# Patient Record
Sex: FEMALE | Race: WHITE | NOT HISPANIC OR LATINO | URBAN - METROPOLITAN AREA
[De-identification: names, ages, dates, MRNs, and addresses within clinical notes are randomized per-mention and may not be internally consistent; named-entity substitution may affect disease eponyms.]

---

## 2022-12-14 ENCOUNTER — EMERGENCY (EMERGENCY)
Facility: HOSPITAL | Age: 52
LOS: 1 days | Discharge: HOME | End: 2022-12-14
Attending: EMERGENCY MEDICINE | Admitting: EMERGENCY MEDICINE
Payer: COMMERCIAL

## 2022-12-14 VITALS
OXYGEN SATURATION: 98 % | DIASTOLIC BLOOD PRESSURE: 72 MMHG | HEART RATE: 100 BPM | TEMPERATURE: 98 F | SYSTOLIC BLOOD PRESSURE: 125 MMHG | RESPIRATION RATE: 18 BRPM

## 2022-12-14 VITALS
DIASTOLIC BLOOD PRESSURE: 74 MMHG | WEIGHT: 111.99 LBS | SYSTOLIC BLOOD PRESSURE: 119 MMHG | OXYGEN SATURATION: 97 % | HEART RATE: 120 BPM | HEIGHT: 65 IN | RESPIRATION RATE: 18 BRPM | TEMPERATURE: 99 F

## 2022-12-14 DIAGNOSIS — R94.5 ABNORMAL RESULTS OF LIVER FUNCTION STUDIES: ICD-10-CM

## 2022-12-14 DIAGNOSIS — D72.829 ELEVATED WHITE BLOOD CELL COUNT, UNSPECIFIED: ICD-10-CM

## 2022-12-14 DIAGNOSIS — R79.89 OTHER SPECIFIED ABNORMAL FINDINGS OF BLOOD CHEMISTRY: ICD-10-CM

## 2022-12-14 DIAGNOSIS — Z86.16 PERSONAL HISTORY OF COVID-19: ICD-10-CM

## 2022-12-14 DIAGNOSIS — Z88.0 ALLERGY STATUS TO PENICILLIN: ICD-10-CM

## 2022-12-14 DIAGNOSIS — Z20.822 CONTACT WITH AND (SUSPECTED) EXPOSURE TO COVID-19: ICD-10-CM

## 2022-12-14 DIAGNOSIS — E88.89 OTHER SPECIFIED METABOLIC DISORDERS: ICD-10-CM

## 2022-12-14 LAB
ALBUMIN SERPL ELPH-MCNC: 4.8 G/DL — SIGNIFICANT CHANGE UP (ref 3.3–5)
ALP SERPL-CCNC: 92 U/L — SIGNIFICANT CHANGE UP (ref 40–120)
ALT FLD-CCNC: 248 U/L — HIGH (ref 10–45)
ANION GAP SERPL CALC-SCNC: 16 MMOL/L — SIGNIFICANT CHANGE UP (ref 5–17)
APPEARANCE UR: ABNORMAL
APTT BLD: 31.5 SEC — SIGNIFICANT CHANGE UP (ref 27.5–35.5)
AST SERPL-CCNC: 118 U/L — HIGH (ref 10–40)
BASE EXCESS BLDV CALC-SCNC: 1.7 MMOL/L — SIGNIFICANT CHANGE UP (ref -2–3)
BASOPHILS # BLD AUTO: 0.05 K/UL — SIGNIFICANT CHANGE UP (ref 0–0.2)
BASOPHILS NFR BLD AUTO: 0.4 % — SIGNIFICANT CHANGE UP (ref 0–2)
BILIRUB SERPL-MCNC: 0.6 MG/DL — SIGNIFICANT CHANGE UP (ref 0.2–1.2)
BILIRUB UR-MCNC: ABNORMAL
BUN SERPL-MCNC: 17 MG/DL — SIGNIFICANT CHANGE UP (ref 7–23)
CALCIUM SERPL-MCNC: 10.2 MG/DL — SIGNIFICANT CHANGE UP (ref 8.4–10.5)
CHLORIDE SERPL-SCNC: 93 MMOL/L — LOW (ref 96–108)
CO2 BLDV-SCNC: 28.6 MMOL/L — HIGH (ref 22–26)
CO2 SERPL-SCNC: 26 MMOL/L — SIGNIFICANT CHANGE UP (ref 22–31)
COLOR SPEC: YELLOW — SIGNIFICANT CHANGE UP
CREAT SERPL-MCNC: 0.85 MG/DL — SIGNIFICANT CHANGE UP (ref 0.5–1.3)
DIFF PNL FLD: NEGATIVE — SIGNIFICANT CHANGE UP
EGFR: 82 ML/MIN/1.73M2 — SIGNIFICANT CHANGE UP
EOSINOPHIL # BLD AUTO: 0.08 K/UL — SIGNIFICANT CHANGE UP (ref 0–0.5)
EOSINOPHIL NFR BLD AUTO: 0.6 % — SIGNIFICANT CHANGE UP (ref 0–6)
GLUCOSE SERPL-MCNC: 83 MG/DL — SIGNIFICANT CHANGE UP (ref 70–99)
GLUCOSE UR QL: NEGATIVE — SIGNIFICANT CHANGE UP
HCO3 BLDV-SCNC: 27 MMOL/L — SIGNIFICANT CHANGE UP (ref 22–29)
HCT VFR BLD CALC: 38.1 % — SIGNIFICANT CHANGE UP (ref 34.5–45)
HGB BLD-MCNC: 12.5 G/DL — SIGNIFICANT CHANGE UP (ref 11.5–15.5)
IMM GRANULOCYTES NFR BLD AUTO: 0.6 % — SIGNIFICANT CHANGE UP (ref 0–0.9)
INR BLD: 1.19 — HIGH (ref 0.88–1.16)
KETONES UR-MCNC: >=80 MG/DL
LACTATE SERPL-SCNC: 1.3 MMOL/L — SIGNIFICANT CHANGE UP (ref 0.5–2)
LEUKOCYTE ESTERASE UR-ACNC: NEGATIVE — SIGNIFICANT CHANGE UP
LYMPHOCYTES # BLD AUTO: 14.8 % — SIGNIFICANT CHANGE UP (ref 13–44)
LYMPHOCYTES # BLD AUTO: 2.08 K/UL — SIGNIFICANT CHANGE UP (ref 1–3.3)
MCHC RBC-ENTMCNC: 27.2 PG — SIGNIFICANT CHANGE UP (ref 27–34)
MCHC RBC-ENTMCNC: 32.8 GM/DL — SIGNIFICANT CHANGE UP (ref 32–36)
MCV RBC AUTO: 82.8 FL — SIGNIFICANT CHANGE UP (ref 80–100)
MONOCYTES # BLD AUTO: 0.74 K/UL — SIGNIFICANT CHANGE UP (ref 0–0.9)
MONOCYTES NFR BLD AUTO: 5.3 % — SIGNIFICANT CHANGE UP (ref 2–14)
NEUTROPHILS # BLD AUTO: 10.97 K/UL — HIGH (ref 1.8–7.4)
NEUTROPHILS NFR BLD AUTO: 78.3 % — HIGH (ref 43–77)
NITRITE UR-MCNC: NEGATIVE — SIGNIFICANT CHANGE UP
NRBC # BLD: 0 /100 WBCS — SIGNIFICANT CHANGE UP (ref 0–0)
PCO2 BLDV: 45 MMHG — HIGH (ref 39–42)
PH BLDV: 7.39 — SIGNIFICANT CHANGE UP (ref 7.32–7.43)
PH UR: 6 — SIGNIFICANT CHANGE UP (ref 5–8)
PLATELET # BLD AUTO: 446 K/UL — HIGH (ref 150–400)
PO2 BLDV: 46 MMHG — HIGH (ref 25–45)
POTASSIUM SERPL-MCNC: 4.5 MMOL/L — SIGNIFICANT CHANGE UP (ref 3.5–5.3)
POTASSIUM SERPL-SCNC: 4.5 MMOL/L — SIGNIFICANT CHANGE UP (ref 3.5–5.3)
PROT SERPL-MCNC: 7.4 G/DL — SIGNIFICANT CHANGE UP (ref 6–8.3)
PROT UR-MCNC: NEGATIVE MG/DL — SIGNIFICANT CHANGE UP
PROTHROM AB SERPL-ACNC: 14.2 SEC — HIGH (ref 10.5–13.4)
RBC # BLD: 4.6 M/UL — SIGNIFICANT CHANGE UP (ref 3.8–5.2)
RBC # FLD: 14.1 % — SIGNIFICANT CHANGE UP (ref 10.3–14.5)
SAO2 % BLDV: 74.2 % — SIGNIFICANT CHANGE UP (ref 67–88)
SARS-COV-2 RNA SPEC QL NAA+PROBE: NEGATIVE — SIGNIFICANT CHANGE UP
SODIUM SERPL-SCNC: 135 MMOL/L — SIGNIFICANT CHANGE UP (ref 135–145)
SP GR SPEC: >=1.03 — SIGNIFICANT CHANGE UP (ref 1–1.03)
UROBILINOGEN FLD QL: 0.2 E.U./DL — SIGNIFICANT CHANGE UP
WBC # BLD: 14.01 K/UL — HIGH (ref 3.8–10.5)
WBC # FLD AUTO: 14.01 K/UL — HIGH (ref 3.8–10.5)

## 2022-12-14 PROCEDURE — 93010 ELECTROCARDIOGRAM REPORT: CPT

## 2022-12-14 PROCEDURE — 83605 ASSAY OF LACTIC ACID: CPT

## 2022-12-14 PROCEDURE — 85730 THROMBOPLASTIN TIME PARTIAL: CPT

## 2022-12-14 PROCEDURE — 71045 X-RAY EXAM CHEST 1 VIEW: CPT

## 2022-12-14 PROCEDURE — 80053 COMPREHEN METABOLIC PANEL: CPT

## 2022-12-14 PROCEDURE — 85610 PROTHROMBIN TIME: CPT

## 2022-12-14 PROCEDURE — 99285 EMERGENCY DEPT VISIT HI MDM: CPT | Mod: 25

## 2022-12-14 PROCEDURE — 85025 COMPLETE CBC W/AUTO DIFF WBC: CPT

## 2022-12-14 PROCEDURE — 87635 SARS-COV-2 COVID-19 AMP PRB: CPT

## 2022-12-14 PROCEDURE — 82803 BLOOD GASES ANY COMBINATION: CPT

## 2022-12-14 PROCEDURE — 36415 COLL VENOUS BLD VENIPUNCTURE: CPT

## 2022-12-14 PROCEDURE — 87040 BLOOD CULTURE FOR BACTERIA: CPT

## 2022-12-14 PROCEDURE — 87086 URINE CULTURE/COLONY COUNT: CPT

## 2022-12-14 PROCEDURE — 71045 X-RAY EXAM CHEST 1 VIEW: CPT | Mod: 26

## 2022-12-14 PROCEDURE — 93005 ELECTROCARDIOGRAM TRACING: CPT

## 2022-12-14 RX ORDER — SODIUM CHLORIDE 9 MG/ML
1000 INJECTION INTRAMUSCULAR; INTRAVENOUS; SUBCUTANEOUS ONCE
Refills: 0 | Status: COMPLETED | OUTPATIENT
Start: 2022-12-14 | End: 2022-12-14

## 2022-12-14 RX ADMIN — SODIUM CHLORIDE 1000 MILLILITER(S): 9 INJECTION INTRAMUSCULAR; INTRAVENOUS; SUBCUTANEOUS at 17:25

## 2022-12-14 NOTE — ED PROVIDER NOTE - OBJECTIVE STATEMENT
51 F sent in for abnml wbc and lft's- px had covid 2 weeks ago- then had abnml wbc of 19 and lfts- ast/alt- elev but under 350-  now w no sx no f/c no n/v no abd pain- limited po intake- and weight loss- only had yogurt today  weight loss in the past 1-2 months of about 5 lbs  no black stools/bloody stools  mod severity

## 2022-12-14 NOTE — ED PROVIDER NOTE - PATIENT PORTAL LINK FT
You can access the FollowMyHealth Patient Portal offered by [facility] by registering at the following website: [email or web address]. By joining [employer]’s FollowMyHealth portal, you will also be able to view your health information using other applications (apps) compatible with our system.

## 2022-12-14 NOTE — ED ADULT TRIAGE NOTE - OTHER COMPLAINTS
Patient referred to ER from Dr. Hill's office for elevated WBC and LFT's. Patient denies pain or weakness.

## 2022-12-14 NOTE — ED PROVIDER NOTE - NSFOLLOWUPINSTRUCTIONS_ED_ALL_ED_FT
Anorexia Nervosa      Anorexia nervosa is an eating disorder that results in lower-than-normal body weight. The disorder usually starts in the teenage years. People with anorexia nervosa are intensely afraid of gaining weight or being fat. They often see themselves as fat even though they may be very thin. To prevent weight gain or to lose weight, they engage in unhealthy behaviors, such as:  •Starving themselves.      •Fasting.      •Exercising too much.    •Trying to get rid of food they have eaten (purging), such as by:  •Making themselves throw up (vomit) after eating.      •Using laxatives or enemas.        These behaviors often interfere with normal life activities. They can lead to serious medical problems and even death. People with anorexia nervosa are also at risk for substance abuse and death due to suicide.      What are the causes?    Common causes of this condition include:  •Depression and other psychological problems.      •Pressure from peers and society to have a thin body.      •Hormone changes at puberty.      •Stress.      •Factors that are inherited from family (genetics).        What increases the risk?    The following factors may make you more likely to develop this condition:  •Being a teenager.      •Being female. Anorexia nervosa can affect males, but it is more common in females.      •Having a mental health disorder, such as depression or anxiety.      •Having a family member who has an eating disorder.      •Participating in sports or activities that put an emphasis on weight and appearance, such as dancing, cheerleading, running, ice-skating, gymnastics, wrestling, or modeling.      •Feeling anxious or tending to be obsessive, even as a young child.      •Thinking a lot about being perfect and following rules.        What are the signs or symptoms?    Symptoms of this condition include:  •Changes in appearance. These may include hair loss, dry hair and skin, spotty skin, brittle nails, and a thin layer of hair covering the skin.      •Discolored teeth or cavities.      •Loss of muscle and fat.      •Lower-than-normal body weight.      •Low blood pressure.      •Slow heart rate.      •Feeling cold all the time.      •Fatigue.      •Constipation.      •Missed periods.      Other symptoms include:  •An intense fear of gaining weight or being fat.      •Having a distorted body image, such as thinking that you are fat when you are not.      •Basing your self-worth on being thin.      •Wearing lots of clothes to hide your body.      •Being in denial that your low body weight is a problem.      •Eating in secret.      •Binge eating.    •Checking your body frequently by:  •Looking in a mirror.      •Pinching the skin on the sides of your body.      •Weighing yourself.      •Doing things that prevent weight gain, such as:  •Limiting (restricting) your calorie intake, starving yourself, or not eating for a long period of time (fasting).      •Purging.      •Exercising excessively.          How is this diagnosed?    This condition may be diagnosed based on:•An assessment by your health care provider. He or she may ask about:  •Your thoughts, feelings, and eating habits.      •Your symptoms.      •Your use of medicine, alcohol, or other substances.        •Measurements of your weight.       •Checking your body temperature, pulse, blood pressure, and breathing rate (vital signs).      •Results of a physical exam.      Your health care provider may also order tests or studies to look for health problems. You may be referred to a mental health specialist for evaluation.    After you have been diagnosed, your level of anorexia nervosa will be rated from mild to severe. The rating depends on your degree of weight loss compared to other people of your age, gender, and stage of development.      How is this treated?  Person talking with a counselor.   The first goal of treatment is to stabilize medical problems and mental health issues that are related to the disorder. The type and length of treatment will depend on your specific situation.    You may need to be treated at the hospital if you have:  •Severe malnutrition.      •Dehydration.      •An imbalance in important chemicals (electrolytes) in your body.      •An abnormal heart rhythm.      •Depression.      •Suicidal thoughts.      The second goal of treatment is to restore your body weight to a healthy level. Successful treatment usually requires a combination of:  •Close monitoring of weight and feeding (behavioral feeding plan) to increase your calorie intake.      •Counseling with a diet and nutrition specialist (dietitian).      •Talk therapy or counseling with a mental health specialist. A form of talk therapy called cognitive behavioral therapy (CBT) can be especially helpful. This therapy helps you recognize the thoughts, beliefs, and emotions that lead to unhealthy eating habits and helps you change them.      •Medicines. Some people with severe anorexia nervosa may benefit from certain medicines that help them to gain weight. Antidepressant medicines can help with symptoms of depression and anxiety.      For adolescents, Family-Based Treatment (FBT) may also be helpful. This is a form of therapy in which your family is invited to have an active role in your treatment and recovery.      Follow these instructions at home:    •Take over-the-counter and prescription medicines only as told by your health care provider.      •Follow a meal plan as told by your health care provider.      •Avoid checking your weight.      •Avoid isolating yourself from your family and friends, especially during mealtimes.      •Keep all follow-up visits. This is important.        Where to find more information    •National Eating Disorders Association ([place]): www.nationaleatingdisorders.org      •National [place] [employer] Mental [employer]ness: www.aicha.org      •[place] Department of Health and Human Services: www.mentalhealth.gov        Contact a health care provider if:    •Your symptoms get worse.      •You start having new symptoms.        Get help right away if:    •You fall down (collapse) or lose consciousness (faint) because of exhaustion or malnutrition.      •You have chest pain or abnormal heart rhythms.      •You vomit blood.      •You have serious thoughts about hurting yourself or someone else.      If you ever feel like you may hurt yourself or others, or have thoughts about taking your own life, get help right away. Go to your nearest emergency department or:   • Call your local emergency services (011 in the [place]       • Call a suicide crisis helpline, such as the National Suicide Prevention Lifeline at 1-888.264.1148 or 619 in the [place] This is open 24 hours a day in the [place]       • Text the Crisis Text Line at 797733 (in the [place]         Summary    •Anorexia nervosa is a serious condition. Low body weight is its main symptom.      •People who have anorexia nervosa often think that they are overweight. They may even try to lose weight by denying themselves food, trying to get rid of the food they have eaten (purge), or using certain medicines to prevent weight gain.      •If anorexia nervosa is not treated, it can lead to death.      •Effective treatments usually involve close monitoring of weight and feeding (behavioral feeding plans) and cognitive behavioral interventions. In some cases, treatment may include medicines.      This information is not intended to replace advice given to you by your health care provider. Make sure you discuss any questions you have with your health care provider.      Document Revised: 07/13/2022 Document Reviewed: 05/17/2022    Elsevier Patient Education © 2022 [employer]

## 2022-12-14 NOTE — ED ADULT NURSE NOTE - OBJECTIVE STATEMENT
Patient a+o x4 referred by md to ED for evaluation of elevated wbc and lft. Patient states feeling well, speaking in full sentences without difficulty, denies sob/cp/dizziness/n/v/fever/chills. Hx depression, pcos. IV initiated, labs collected and sent, IVF in progress. EKG in progress. Awaiting results.

## 2022-12-16 LAB
CULTURE RESULTS: NO GROWTH — SIGNIFICANT CHANGE UP
SPECIMEN SOURCE: SIGNIFICANT CHANGE UP

## 2022-12-19 LAB
CULTURE RESULTS: SIGNIFICANT CHANGE UP
CULTURE RESULTS: SIGNIFICANT CHANGE UP
SPECIMEN SOURCE: SIGNIFICANT CHANGE UP
SPECIMEN SOURCE: SIGNIFICANT CHANGE UP

## 2024-10-17 NOTE — ED ADULT TRIAGE NOTE - CCCP TRG CHIEF CMPLNT
Problem: Adult Inpatient Plan of Care  Goal: Plan of Care Review  10/17/2024 0649 by Ju Garza RN  Outcome: Progressing  Flowsheets  Taken 10/17/2024 0649  Progress: improving  Plan of Care Reviewed With: patient  Taken 10/17/2024 0645  Outcome Evaluation: Pt A&Ox4, VSS. Pt has been stand and pivot to [facility] [place], has not ambulated d/t continued numbness from pre-op block. Pt c/o pain x 1, pain managed with prn and scheduled medications. Pt plans to d/c home today with outpatient PT, meds to bed, and her spouse will be her ride home.   Goal Outcome Evaluation:  Plan of Care Reviewed With: patient        Progress: improving  Outcome Evaluation: Pt A&Ox4, VSS. Pt has been stand and pivot to [facility] [place], has not ambulated d/t continued numbness from pre-op block. Pt c/o pain x 1, pain managed with prn and scheduled medications. Pt plans to d/c home today with outpatient PT, meds to bed, and her spouse will be her ride home.                              abnormal lab result